# Patient Record
Sex: MALE | Race: WHITE | NOT HISPANIC OR LATINO | Employment: FULL TIME | URBAN - METROPOLITAN AREA
[De-identification: names, ages, dates, MRNs, and addresses within clinical notes are randomized per-mention and may not be internally consistent; named-entity substitution may affect disease eponyms.]

---

## 2024-06-30 ENCOUNTER — APPOINTMENT (OUTPATIENT)
Dept: CARDIOLOGY | Facility: HOSPITAL | Age: 34
End: 2024-06-30
Payer: COMMERCIAL

## 2024-06-30 ENCOUNTER — HOSPITAL ENCOUNTER (EMERGENCY)
Facility: HOSPITAL | Age: 34
Discharge: HOME | End: 2024-06-30
Attending: STUDENT IN AN ORGANIZED HEALTH CARE EDUCATION/TRAINING PROGRAM
Payer: COMMERCIAL

## 2024-06-30 VITALS
OXYGEN SATURATION: 99 % | WEIGHT: 185 LBS | BODY MASS INDEX: 28.04 KG/M2 | HEIGHT: 68 IN | HEART RATE: 77 BPM | SYSTOLIC BLOOD PRESSURE: 153 MMHG | RESPIRATION RATE: 18 BRPM | DIASTOLIC BLOOD PRESSURE: 73 MMHG | TEMPERATURE: 96.8 F

## 2024-06-30 DIAGNOSIS — M62.838 MUSCLE SPASMS OF BOTH LOWER EXTREMITIES: Primary | ICD-10-CM

## 2024-06-30 LAB
ALBUMIN SERPL BCP-MCNC: 4.8 G/DL (ref 3.4–5)
ALP SERPL-CCNC: 66 U/L (ref 33–120)
ALT SERPL W P-5'-P-CCNC: 32 U/L (ref 10–52)
ANION GAP SERPL CALC-SCNC: 11 MMOL/L (ref 10–20)
AST SERPL W P-5'-P-CCNC: 26 U/L (ref 9–39)
BASOPHILS # BLD AUTO: 0.05 X10*3/UL (ref 0–0.1)
BASOPHILS NFR BLD AUTO: 0.9 %
BILIRUB SERPL-MCNC: 0.7 MG/DL (ref 0–1.2)
BUN SERPL-MCNC: 14 MG/DL (ref 6–23)
CALCIUM SERPL-MCNC: 9.8 MG/DL (ref 8.6–10.3)
CHLORIDE SERPL-SCNC: 103 MMOL/L (ref 98–107)
CO2 SERPL-SCNC: 26 MMOL/L (ref 21–32)
CREAT SERPL-MCNC: 0.99 MG/DL (ref 0.5–1.3)
EGFRCR SERPLBLD CKD-EPI 2021: >90 ML/MIN/1.73M*2
EOSINOPHIL # BLD AUTO: 0.03 X10*3/UL (ref 0–0.7)
EOSINOPHIL NFR BLD AUTO: 0.5 %
ERYTHROCYTE [DISTWIDTH] IN BLOOD BY AUTOMATED COUNT: 11.7 % (ref 11.5–14.5)
GLUCOSE SERPL-MCNC: 86 MG/DL (ref 74–99)
HCT VFR BLD AUTO: 36.8 % (ref 41–52)
HGB BLD-MCNC: 13.3 G/DL (ref 13.5–17.5)
IMM GRANULOCYTES # BLD AUTO: 0.01 X10*3/UL (ref 0–0.7)
IMM GRANULOCYTES NFR BLD AUTO: 0.2 % (ref 0–0.9)
LYMPHOCYTES # BLD AUTO: 1.31 X10*3/UL (ref 1.2–4.8)
LYMPHOCYTES NFR BLD AUTO: 22.8 %
MAGNESIUM SERPL-MCNC: 2.05 MG/DL (ref 1.6–2.4)
MCH RBC QN AUTO: 29.9 PG (ref 26–34)
MCHC RBC AUTO-ENTMCNC: 36.1 G/DL (ref 32–36)
MCV RBC AUTO: 83 FL (ref 80–100)
MONOCYTES # BLD AUTO: 0.48 X10*3/UL (ref 0.1–1)
MONOCYTES NFR BLD AUTO: 8.4 %
NEUTROPHILS # BLD AUTO: 3.86 X10*3/UL (ref 1.2–7.7)
NEUTROPHILS NFR BLD AUTO: 67.2 %
NRBC BLD-RTO: 0 /100 WBCS (ref 0–0)
PHOSPHATE SERPL-MCNC: 3.5 MG/DL (ref 2.5–4.9)
PLATELET # BLD AUTO: 201 X10*3/UL (ref 150–450)
POTASSIUM SERPL-SCNC: 3.9 MMOL/L (ref 3.5–5.3)
PROT SERPL-MCNC: 7.4 G/DL (ref 6.4–8.2)
RBC # BLD AUTO: 4.45 X10*6/UL (ref 4.5–5.9)
SODIUM SERPL-SCNC: 136 MMOL/L (ref 136–145)
WBC # BLD AUTO: 5.7 X10*3/UL (ref 4.4–11.3)

## 2024-06-30 PROCEDURE — 99283 EMERGENCY DEPT VISIT LOW MDM: CPT | Mod: 25

## 2024-06-30 PROCEDURE — 93005 ELECTROCARDIOGRAM TRACING: CPT

## 2024-06-30 PROCEDURE — 84100 ASSAY OF PHOSPHORUS: CPT | Performed by: STUDENT IN AN ORGANIZED HEALTH CARE EDUCATION/TRAINING PROGRAM

## 2024-06-30 PROCEDURE — 83735 ASSAY OF MAGNESIUM: CPT | Performed by: STUDENT IN AN ORGANIZED HEALTH CARE EDUCATION/TRAINING PROGRAM

## 2024-06-30 PROCEDURE — 84075 ASSAY ALKALINE PHOSPHATASE: CPT | Performed by: STUDENT IN AN ORGANIZED HEALTH CARE EDUCATION/TRAINING PROGRAM

## 2024-06-30 PROCEDURE — 2500000004 HC RX 250 GENERAL PHARMACY W/ HCPCS (ALT 636 FOR OP/ED): Performed by: STUDENT IN AN ORGANIZED HEALTH CARE EDUCATION/TRAINING PROGRAM

## 2024-06-30 PROCEDURE — 85025 COMPLETE CBC W/AUTO DIFF WBC: CPT | Performed by: STUDENT IN AN ORGANIZED HEALTH CARE EDUCATION/TRAINING PROGRAM

## 2024-06-30 PROCEDURE — 96360 HYDRATION IV INFUSION INIT: CPT

## 2024-06-30 PROCEDURE — 36415 COLL VENOUS BLD VENIPUNCTURE: CPT | Performed by: STUDENT IN AN ORGANIZED HEALTH CARE EDUCATION/TRAINING PROGRAM

## 2024-06-30 ASSESSMENT — COLUMBIA-SUICIDE SEVERITY RATING SCALE - C-SSRS
1. IN THE PAST MONTH, HAVE YOU WISHED YOU WERE DEAD OR WISHED YOU COULD GO TO SLEEP AND NOT WAKE UP?: NO
6. HAVE YOU EVER DONE ANYTHING, STARTED TO DO ANYTHING, OR PREPARED TO DO ANYTHING TO END YOUR LIFE?: NO
2. HAVE YOU ACTUALLY HAD ANY THOUGHTS OF KILLING YOURSELF?: NO

## 2024-06-30 ASSESSMENT — LIFESTYLE VARIABLES
HAVE PEOPLE ANNOYED YOU BY CRITICIZING YOUR DRINKING: NO
HAVE YOU EVER FELT YOU SHOULD CUT DOWN ON YOUR DRINKING: NO
EVER FELT BAD OR GUILTY ABOUT YOUR DRINKING: NO
EVER HAD A DRINK FIRST THING IN THE MORNING TO STEADY YOUR NERVES TO GET RID OF A HANGOVER: NO
TOTAL SCORE: 0

## 2024-06-30 NOTE — ED TRIAGE NOTES
Pt comes to ED with c/o muscle twitching all over his body. Pt states he thinks it may be anxiety. Pt states he states he started prozac a week ago and stopped taking it 3 days ago

## 2024-06-30 NOTE — ED PROVIDER NOTES
HPI   Chief Complaint   Patient presents with    Spasms       This is a 33-year-old male with past medical history of anxiety presenting to the emergency department for muscle spasms.  Patient states his symptoms started yesterday with muscle spasms to the right thigh.  Since then he has had muscle spasms over the bilateral legs as well as to his forearms.  Last night he had tingling to his right arm that he noticed while he was trying to sleep.  Today he has some tingling to the left arm.  Patient unsure if this is secondary to his anxiety.  He was recently started on Prozac 1 week ago but stopped it when these symptoms started.  He otherwise denies headaches, vision changes, chest pain, shortness of breath, abdominal pain, nausea/vomiting, fever/chills, diarrhea, urinary symptoms, lower extremity pain or swelling.      History provided by:  Patient   used: No                        No data recorded                   Patient History   Past Medical History:   Diagnosis Date    Contact with and (suspected) exposure to viral hepatitis 03/15/2016    Exposure to hepatitis C    Left lower quadrant pain 03/29/2016    Colicky LLQ abdominal pain    Other pancytopenia (Multi) 02/26/2016    Pancytopenia    Personal history of diseases of the blood and blood-forming organs and certain disorders involving the immune mechanism 03/15/2016    History of anemia    Personal history of other diseases of the digestive system 03/29/2016    History of constipation    Personal history of other diseases of the nervous system and sense organs 09/16/2015    History of conjunctivitis    Personal history of other infectious and parasitic diseases 02/25/2016    History of herpes zoster    Personal history of other infectious and parasitic diseases     History of varicella    Testicular pain, unspecified 03/15/2016    Testicular pain     Past Surgical History:   Procedure Laterality Date    TONSILLECTOMY  04/08/2015     Tonsillectomy     No family history on file.  Social History     Tobacco Use    Smoking status: Not on file    Smokeless tobacco: Not on file   Substance Use Topics    Alcohol use: Not on file    Drug use: Not on file       Physical Exam   ED Triage Vitals [06/30/24 1848]   Temperature Heart Rate Respirations BP   36 °C (96.8 °F) 77 18 153/73      Pulse Ox Temp src Heart Rate Source Patient Position   99 % -- -- --      BP Location FiO2 (%)     -- --       Physical Exam  GEN: well appearing, no acute distress  HEAD: atraumatic  EYES: EOMI, PEERL  NECK: supple, no C-spine tenderness, no stepoffs or deformities  CVS/CHEST: reg rate, nl rhythm, no murmurs/gallops/rubs  PULM: CTAB b/l no wheezes, crackles, or rhonchi   GI: NT/ND, no masses or organomegaly, soft, no guarding  EXT: no LE edema   NEURO: CN 2-12 grossly intact, no focal deficits, no facial asymmetry, moving all extremities, 5/5 Strength in bicep/tricep/hip flexor/plantar flexion. Sensation over these muscle groups intact. Can ambulate without gait disturbance.  PSYCH: AAOx3 answers questions appropriately  ED Course & MDM   ED Course as of 07/01/24 1929   Sun Jun 30, 2024 2027 EKG as interpreted by me: Sinus rhythm at 67 bpm, normal axis, intervals unremarkable, no significant ST elevations or depressions [DE]   2028 Patient's lab work largely unremarkable.  He is able to ambulate without any difficulty.  I do feel that he is safe for discharge home.  Patient minimal to this plan and he will follow-up with his primary care physician.  Return precautions discussed and patient discharged stable condition. [DE]      ED Course User Index  [DE] Laureano Pearl MD         Diagnoses as of 07/01/24 1929   Muscle spasms of both lower extremities       Medical Decision Making  This is a 33-year-old male with past medical history of anxiety presenting to the emergency department for muscle spasms.  Patient stable upon presentation to the emergency department, no acute  distress and vitals are unremarkable.  On exam patient is very well-appearing.  He largely has no acute findings with full strength and sensation in all extremities.  He is answering questions appropriately.  He has no focal deficits on his exam.  Lungs are clear, abdomen is nontender.  Patient will be evaluated for potential electrolyte abnormality causing his spasms/tingling.  There may be an intolerance to Prozac given the recent start of his medication.  No concern for serotonin syndrome given history and physical.  No concern for stroke.    Procedure  Procedures     Laureano Pearl MD  07/01/24 8770

## 2024-07-01 LAB
ATRIAL RATE: 67 BPM
P AXIS: 25 DEGREES
P OFFSET: 192 MS
P ONSET: 144 MS
PR INTERVAL: 152 MS
Q ONSET: 220 MS
QRS COUNT: 11 BEATS
QRS DURATION: 88 MS
QT INTERVAL: 386 MS
QTC CALCULATION(BAZETT): 407 MS
QTC FREDERICIA: 400 MS
R AXIS: 32 DEGREES
T AXIS: 34 DEGREES
T OFFSET: 413 MS
VENTRICULAR RATE: 67 BPM

## 2024-07-03 ENCOUNTER — OFFICE VISIT (OUTPATIENT)
Dept: PRIMARY CARE | Facility: CLINIC | Age: 34
End: 2024-07-03
Payer: COMMERCIAL

## 2024-07-03 ENCOUNTER — LAB (OUTPATIENT)
Dept: LAB | Facility: LAB | Age: 34
End: 2024-07-03
Payer: COMMERCIAL

## 2024-07-03 VITALS
SYSTOLIC BLOOD PRESSURE: 136 MMHG | DIASTOLIC BLOOD PRESSURE: 86 MMHG | OXYGEN SATURATION: 98 % | HEIGHT: 68 IN | WEIGHT: 190 LBS | HEART RATE: 90 BPM | BODY MASS INDEX: 28.79 KG/M2

## 2024-07-03 DIAGNOSIS — F41.1 GENERALIZED ANXIETY DISORDER: ICD-10-CM

## 2024-07-03 DIAGNOSIS — D64.9 ANEMIA, UNSPECIFIED TYPE: ICD-10-CM

## 2024-07-03 DIAGNOSIS — R25.3 FREQUENT FASCICULATION OF MUSCLE OF EXTREMITY: ICD-10-CM

## 2024-07-03 DIAGNOSIS — Z00.00 ANNUAL PHYSICAL EXAM: ICD-10-CM

## 2024-07-03 DIAGNOSIS — Z00.00 ANNUAL PHYSICAL EXAM: Primary | ICD-10-CM

## 2024-07-03 LAB
CHOLEST SERPL-MCNC: 164 MG/DL (ref 0–199)
CHOLESTEROL/HDL RATIO: 4.1
CK SERPL-CCNC: 127 U/L (ref 0–325)
CRP SERPL-MCNC: 0.1 MG/DL
ERYTHROCYTE [SEDIMENTATION RATE] IN BLOOD BY WESTERGREN METHOD: 8 MM/H (ref 0–15)
FERRITIN SERPL-MCNC: 342 NG/ML (ref 20–300)
HCV AB SER QL: NONREACTIVE
HDLC SERPL-MCNC: 39.7 MG/DL
HIV 1+2 AB+HIV1 P24 AG SERPL QL IA: NONREACTIVE
IRON SATN MFR SERPL: 26 % (ref 25–45)
IRON SERPL-MCNC: 98 UG/DL (ref 35–150)
LDLC SERPL CALC-MCNC: 99 MG/DL
NON HDL CHOLESTEROL: 124 MG/DL (ref 0–149)
TIBC SERPL-MCNC: 371 UG/DL (ref 240–445)
TRIGL SERPL-MCNC: 129 MG/DL (ref 0–149)
TSH SERPL-ACNC: 0.68 MIU/L (ref 0.44–3.98)
UIBC SERPL-MCNC: 273 UG/DL (ref 110–370)
VLDL: 26 MG/DL (ref 0–40)

## 2024-07-03 PROCEDURE — 85652 RBC SED RATE AUTOMATED: CPT

## 2024-07-03 PROCEDURE — 83550 IRON BINDING TEST: CPT

## 2024-07-03 PROCEDURE — 82728 ASSAY OF FERRITIN: CPT

## 2024-07-03 PROCEDURE — 99395 PREV VISIT EST AGE 18-39: CPT | Performed by: STUDENT IN AN ORGANIZED HEALTH CARE EDUCATION/TRAINING PROGRAM

## 2024-07-03 PROCEDURE — 87389 HIV-1 AG W/HIV-1&-2 AB AG IA: CPT

## 2024-07-03 PROCEDURE — 80061 LIPID PANEL: CPT

## 2024-07-03 PROCEDURE — 86140 C-REACTIVE PROTEIN: CPT

## 2024-07-03 PROCEDURE — 83540 ASSAY OF IRON: CPT

## 2024-07-03 PROCEDURE — 99214 OFFICE O/P EST MOD 30 MIN: CPT | Performed by: STUDENT IN AN ORGANIZED HEALTH CARE EDUCATION/TRAINING PROGRAM

## 2024-07-03 PROCEDURE — 84443 ASSAY THYROID STIM HORMONE: CPT

## 2024-07-03 PROCEDURE — 82550 ASSAY OF CK (CPK): CPT

## 2024-07-03 PROCEDURE — 86803 HEPATITIS C AB TEST: CPT

## 2024-07-03 PROCEDURE — 36415 COLL VENOUS BLD VENIPUNCTURE: CPT

## 2024-07-03 PROCEDURE — 1036F TOBACCO NON-USER: CPT | Performed by: STUDENT IN AN ORGANIZED HEALTH CARE EDUCATION/TRAINING PROGRAM

## 2024-07-03 RX ORDER — HYDROXYZINE HYDROCHLORIDE 25 MG/1
25 TABLET, FILM COATED ORAL EVERY 8 HOURS PRN
Qty: 21 TABLET | Refills: 0 | Status: SHIPPED | OUTPATIENT
Start: 2024-07-03 | End: 2024-07-10

## 2024-07-03 NOTE — PROGRESS NOTES
"Subjective   Patient ID: Chet Parker is a 33 y.o. male who presents for the following    Assessment/Plan   Preventative Medicine  -UTD on vaccines  -Lipid panel, HCV, HIV screen. DM screen with CMP     Muscle Twitching  -2 weeks in duration, asymmetric   -Could be stress related   -Check labs  -Check EMG    VESNA  -PRN atarax rx  -Likely 2/2 to stress related to having  at home. He is also not sleeping well     Anemia   -Check Iron panel  -Pt has family hx of thalassemia but states that he was told that he does not have the trait.     HPI  33M presents to [facility] and acute sick visit.   States that for the past 1-2 weeks he has had \"twitching\" in the R thigh and numbness in the R arm. Numbness in R arm lasted for 15-20 minutes.   Since then has sporadic \"shooting\" pain and twitching in various muscle groups. NO symptoms last for more than 15-20 minutes. States that he has a  at home, but he does not feel stressed out. Is only sleeping a few hours at a time due to having to wake up to feed . Also has 4 year old child at home and states that he required anxiety medication when this child was a  due to stress.     Otherwise denies any other major complaints.   Generally in good health.   Stays active and eats healthy.     Denies fevers, chills, weight loss, lightheadedness, dizziness, vision changes, sore throat, runny nose, CP, SOB, cough, palpitations, n/v/d, abd pain, black/bloody stools, arthralgias, mood disturbance.    PMH: overweight  Surgeries: tonsillectomy      Family History   Problem Relation Name Age of Onset    Arthritis Mother Clarisa     Depression Mother Clarisa     Diabetes Mother Clarisa     Hyperlipidemia Mother Clarisa     Hypertension Mother Clarisa     Hyperlipidemia Father Mario Alberto     Breast cancer Maternal Grandmother Pura Lipscomb      Social Determinants of Health     Tobacco Use: Low Risk  (7/3/2024)    Patient History     Smoking Tobacco Use: Never     Smokeless " "Tobacco Use: Never     Passive Exposure: Not on file   Alcohol Use: Not on file   Financial Resource Strain: Not on file   Food Insecurity: Not on file   Transportation Needs: Not on file   Physical Activity: Not on file   Stress: Not on file   Social Connections: Not on file   Intimate Partner Violence: Not on file   Depression: Not on file   Housing Stability: Not on file   Utilities: Not on file   Digital Equity: Not on file   Health Literacy: Not on file     Alcohol use: occasional     Personal Hx  -Works in medical device sales  -  -2 children     Visit Vitals  /86   Pulse 90   Ht 1.727 m (5' 8\")   Wt 86.2 kg (190 lb)   SpO2 98%   BMI 28.89 kg/m²   Smoking Status Never   BSA 2.03 m²     PHYSICAL EXAM   Physical Exam     Visit Vitals  /86   Pulse 90   Ht 1.727 m (5' 8\")   Wt 86.2 kg (190 lb)   SpO2 98%   BMI 28.89 kg/m²   Smoking Status Never   BSA 2.03 m²        General: NAD. NCAT. Aox3   HEENT: PERRLA. EOMI. MMM. Nares patent bl.  Cardiovascular: RRR. No MRG. S1/S2 wnl.   Respiratory: CTABL. No acute respiratory distress.   GI: Soft, NT abdomen.   MSK: ROM x 4. CTLS non-tender.   Extremities: No edema. Cap refill < 2 sec.   Skin: No rashes or bruises.   Neuro: Aox3. Cranial Nerves grossly intact. Motor/sensory wnl. DTRs equal and symmetric in BLLE and BLUEs  Psych: Mood wnl.       REVIEW OF SYSTEMS   ROS in HPI     No Known Allergies    Current Outpatient Medications   Medication Sig Dispense Refill    semaglutide (OZEMPIC) 1 mg/dose (4 mg/3 mL) pen injector Inject 1 mg under the skin every 7 days.       No current facility-administered medications for this visit.       Objective     Admission on 06/30/2024, Discharged on 06/30/2024   Component Date Value Ref Range Status    WBC 06/30/2024 5.7  4.4 - 11.3 x10*3/uL Final    nRBC 06/30/2024 0.0  0.0 - 0.0 /100 WBCs Final    RBC 06/30/2024 4.45 (L)  4.50 - 5.90 x10*6/uL Final    Hemoglobin 06/30/2024 13.3 (L)  13.5 - 17.5 g/dL Final    " Hematocrit 06/30/2024 36.8 (L)  41.0 - 52.0 % Final    MCV 06/30/2024 83  80 - 100 fL Final    MCH 06/30/2024 29.9  26.0 - 34.0 pg Final    MCHC 06/30/2024 36.1 (H)  32.0 - 36.0 g/dL Final    RDW 06/30/2024 11.7  11.5 - 14.5 % Final    Platelets 06/30/2024 201  150 - 450 x10*3/uL Final    Neutrophils % 06/30/2024 67.2  40.0 - 80.0 % Final    Immature Granulocytes %, Automated 06/30/2024 0.2  0.0 - 0.9 % Final    Lymphocytes % 06/30/2024 22.8  13.0 - 44.0 % Final    Monocytes % 06/30/2024 8.4  2.0 - 10.0 % Final    Eosinophils % 06/30/2024 0.5  0.0 - 6.0 % Final    Basophils % 06/30/2024 0.9  0.0 - 2.0 % Final    Neutrophils Absolute 06/30/2024 3.86  1.20 - 7.70 x10*3/uL Final    Immature Granulocytes Absolute, Au* 06/30/2024 0.01  0.00 - 0.70 x10*3/uL Final    Lymphocytes Absolute 06/30/2024 1.31  1.20 - 4.80 x10*3/uL Final    Monocytes Absolute 06/30/2024 0.48  0.10 - 1.00 x10*3/uL Final    Eosinophils Absolute 06/30/2024 0.03  0.00 - 0.70 x10*3/uL Final    Basophils Absolute 06/30/2024 0.05  0.00 - 0.10 x10*3/uL Final    Glucose 06/30/2024 86  74 - 99 mg/dL Final    Sodium 06/30/2024 136  136 - 145 mmol/L Final    Potassium 06/30/2024 3.9  3.5 - 5.3 mmol/L Final    Chloride 06/30/2024 103  98 - 107 mmol/L Final    Bicarbonate 06/30/2024 26  21 - 32 mmol/L Final    Anion Gap 06/30/2024 11  10 - 20 mmol/L Final    Urea Nitrogen 06/30/2024 14  6 - 23 mg/dL Final    Creatinine 06/30/2024 0.99  0.50 - 1.30 mg/dL Final    eGFR 06/30/2024 >90  >60 mL/min/1.73m*2 Final    Calcium 06/30/2024 9.8  8.6 - 10.3 mg/dL Final    Albumin 06/30/2024 4.8  3.4 - 5.0 g/dL Final    Alkaline Phosphatase 06/30/2024 66  33 - 120 U/L Final    Total Protein 06/30/2024 7.4  6.4 - 8.2 g/dL Final    AST 06/30/2024 26  9 - 39 U/L Final    Bilirubin, Total 06/30/2024 0.7  0.0 - 1.2 mg/dL Final    ALT 06/30/2024 32  10 - 52 U/L Final    Magnesium 06/30/2024 2.05  1.60 - 2.40 mg/dL Final    Phosphorus 06/30/2024 3.5  2.5 - 4.9 mg/dL Final     Ventricular Rate 06/30/2024 67  BPM Preliminary    Atrial Rate 06/30/2024 67  BPM Preliminary    RI Interval 06/30/2024 152  ms Preliminary    QRS Duration 06/30/2024 88  ms Preliminary    QT Interval 06/30/2024 386  ms Preliminary    QTC Calculation(Bazett) 06/30/2024 407  ms Preliminary    P Axis 06/30/2024 25  degrees Preliminary    R Axis 06/30/2024 32  degrees Preliminary    T Axis 06/30/2024 34  degrees Preliminary    QRS Count 06/30/2024 11  beats Preliminary    Q Onset 06/30/2024 220  ms Preliminary    P Onset 06/30/2024 144  ms Preliminary    P Offset 06/30/2024 192  ms Preliminary    T Offset 06/30/2024 413  ms Preliminary    QTC Fredericia 06/30/2024 400  ms Preliminary       Radiology: Reviewed imaging in powerchart.  ECG 12 lead    Result Date: 7/1/2024  Normal sinus rhythm Normal ECG No previous ECGs available      Family History   Problem Relation Name Age of Onset    Arthritis Mother Clarisa     Depression Mother Clarisa     Diabetes Mother Clarisa     Hyperlipidemia Mother Clarisa     Hypertension Mother Clarisa     Hyperlipidemia Father Mario Alberto     Breast cancer Maternal Grandmother Pura Lipscomb      Social History     Socioeconomic History    Marital status:      Spouse name: None    Number of children: None    Years of education: None    Highest education level: None   Occupational History    None   Tobacco Use    Smoking status: Never    Smokeless tobacco: Never   Substance and Sexual Activity    Alcohol use: Yes     Alcohol/week: 4.0 standard drinks of alcohol     Types: 2 Cans of beer, 2 Shots of liquor per week    Drug use: Never    Sexual activity: Yes     Partners: Female     Birth control/protection: I.U.D.   Other Topics Concern    None   Social History Narrative    None     Social Determinants of Health     Financial Resource Strain: Not on file   Food Insecurity: Not on file   Transportation Needs: Not on file   Physical Activity: Not on file   Stress: Not on file   Social Connections: Not on  file   Intimate Partner Violence: Not on file   Housing Stability: Not on file     Past Medical History:   Diagnosis Date    Contact with and (suspected) exposure to viral hepatitis 03/15/2016    Exposure to hepatitis C    Left lower quadrant pain 03/29/2016    Colicky LLQ abdominal pain    Other pancytopenia (Multi) 02/26/2016    Pancytopenia    Personal history of diseases of the blood and blood-forming organs and certain disorders involving the immune mechanism 03/15/2016    History of anemia    Personal history of other diseases of the digestive system 03/29/2016    History of constipation    Personal history of other diseases of the nervous system and sense organs 09/16/2015    History of conjunctivitis    Personal history of other infectious and parasitic diseases 02/25/2016    History of herpes zoster    Personal history of other infectious and parasitic diseases     History of varicella    Testicular pain, unspecified 03/15/2016    Testicular pain     Past Surgical History:   Procedure Laterality Date    TONSILLECTOMY  04/08/2015    Tonsillectomy       Charting was completed using voice recognition technology and may include unintended errors.

## 2024-07-05 ENCOUNTER — TELEPHONE (OUTPATIENT)
Dept: PRIMARY CARE | Facility: CLINIC | Age: 34
End: 2024-07-05
Payer: COMMERCIAL

## 2024-07-05 DIAGNOSIS — R53.83 FATIGUE, UNSPECIFIED TYPE: ICD-10-CM

## 2024-07-05 NOTE — TELEPHONE ENCOUNTER
----- Message from Harley Solo MD sent at 7/5/2024  2:25 PM EDT -----  Ferritin is mildly elevated. Non-specific on its own. All other labs wnl.

## 2024-07-08 NOTE — TELEPHONE ENCOUNTER
Spoke to patient. Informed him of below information. Pt expressed understanding.     He wants to know if a lab can be ordered to check testosterone.

## 2024-07-10 ENCOUNTER — LAB (OUTPATIENT)
Dept: LAB | Facility: LAB | Age: 34
End: 2024-07-10
Payer: COMMERCIAL

## 2024-07-10 DIAGNOSIS — R53.83 FATIGUE, UNSPECIFIED TYPE: ICD-10-CM

## 2024-07-10 PROCEDURE — 84402 ASSAY OF FREE TESTOSTERONE: CPT

## 2024-07-10 PROCEDURE — 36415 COLL VENOUS BLD VENIPUNCTURE: CPT

## 2024-07-15 ENCOUNTER — PATIENT MESSAGE (OUTPATIENT)
Dept: PRIMARY CARE | Facility: CLINIC | Age: 34
End: 2024-07-15
Payer: COMMERCIAL

## 2024-07-15 ENCOUNTER — TELEPHONE (OUTPATIENT)
Dept: PRIMARY CARE | Facility: CLINIC | Age: 34
End: 2024-07-15
Payer: COMMERCIAL

## 2024-07-15 LAB
TESTOSTERONE FREE (CHAN): 83.6 PG/ML (ref 35–155)
TESTOSTERONE,TOTAL,LC-MS/MS: 486 NG/DL (ref 250–1100)

## 2024-07-15 NOTE — TELEPHONE ENCOUNTER
----- Message from Harley Solo sent at 7/15/2024  1:45 PM EDT -----  Morning testosterone levels l

## 2024-08-06 ENCOUNTER — HOSPITAL ENCOUNTER (OUTPATIENT)
Dept: NEUROLOGY | Facility: HOSPITAL | Age: 34
Discharge: HOME | End: 2024-08-06
Payer: COMMERCIAL

## 2024-08-06 DIAGNOSIS — R25.3 FREQUENT FASCICULATION OF MUSCLE OF EXTREMITY: ICD-10-CM

## 2024-08-06 PROCEDURE — 95886 MUSC TEST DONE W/N TEST COMP: CPT | Performed by: PSYCHIATRY & NEUROLOGY

## 2024-08-06 PROCEDURE — 95908 NRV CNDJ TST 3-4 STUDIES: CPT | Performed by: PSYCHIATRY & NEUROLOGY

## 2024-08-13 ENCOUNTER — TELEPHONE (OUTPATIENT)
Dept: PRIMARY CARE | Facility: CLINIC | Age: 34
End: 2024-08-13
Payer: COMMERCIAL

## 2024-08-30 ENCOUNTER — PATIENT MESSAGE (OUTPATIENT)
Dept: PRIMARY CARE | Facility: CLINIC | Age: 34
End: 2024-08-30
Payer: COMMERCIAL

## 2024-10-16 ENCOUNTER — APPOINTMENT (OUTPATIENT)
Dept: NEUROLOGY | Facility: HOSPITAL | Age: 34
End: 2024-10-16
Payer: COMMERCIAL

## 2024-12-31 ENCOUNTER — APPOINTMENT (OUTPATIENT)
Dept: ORTHOPEDIC SURGERY | Facility: CLINIC | Age: 34
End: 2024-12-31
Payer: COMMERCIAL

## 2024-12-31 DIAGNOSIS — M25.531 WRIST PAIN, RIGHT: ICD-10-CM

## 2024-12-31 DIAGNOSIS — S63.501A RIGHT WRIST SPRAIN, INITIAL ENCOUNTER: Primary | ICD-10-CM

## 2024-12-31 PROCEDURE — 99203 OFFICE O/P NEW LOW 30 MIN: CPT | Performed by: FAMILY MEDICINE

## 2024-12-31 PROCEDURE — 1036F TOBACCO NON-USER: CPT | Performed by: FAMILY MEDICINE

## 2024-12-31 NOTE — PROGRESS NOTES
History of Present Illness   Chief Complaint   Patient presents with    Right Wrist - Injury     [id number]  FELL ICE SKAITING       The patient is 34 y.o. right-hand-dominant male  here with a complaint of   right wrist pain.  Onset of symptoms around 6 weeks ago after falling while ice-skating landing on outstretched right wrist, says he fell off to his right side with some acute onset of pain.  Symptoms are ongoing for the next few weeks prompting him to be seen at outside urgent care, he had x-rays obtained there, report but no imaging available for review, report of right wrist x-ray was read as unremarkable, negative for fracture.  He monitored for symptom improvement for an additional 2 to 3 weeks, given ongoing pain he scheduled follow-up visit with me today for further evaluation.  He says pain is focal to the ulnar aspect of his wrist, no significant pain with rest, occasional pain with day-to-day activity, he says that pushing himself up from a seated position will cause pain, he has attempted to try a push-up with pain, he enjoys bowling but is not able to do so because of wrist pain.  He denies any swelling or bruising now or at time of injury.  No other specific treatments aside from activity modification.    Past Medical History:   Diagnosis Date    Contact with and (suspected) exposure to viral hepatitis 03/15/2016    Exposure to hepatitis C    Left lower quadrant pain 03/29/2016    Colicky LLQ abdominal pain    Other pancytopenia (Multi) 02/26/2016    Pancytopenia    Personal history of diseases of the blood and blood-forming organs and certain disorders involving the immune mechanism 03/15/2016    History of anemia    Personal history of other diseases of the digestive system 03/29/2016    History of constipation    Personal history of other diseases of the nervous system and sense organs 09/16/2015    History of conjunctivitis    Personal history of other infectious and parasitic diseases 02/25/2016     History of herpes zoster    Personal history of other infectious and parasitic diseases     History of varicella    Testicular pain, unspecified 03/15/2016    Testicular pain       Medication Documentation Review Audit       Reviewed by Harley Solo MD (Physician) on 07/04/24 at 1806      Medication Order Taking? Sig Documenting Provider Last Dose Status   hydrOXYzine HCL (Atarax) 25 mg tablet [id number]  Take 1 tablet (25 mg) by mouth every 8 hours if needed for anxiety for up to 7 days. Harley Solo MD  Active   semaglutide (OZEMPIC) 1 mg/dose (4 mg/3 mL) pen injector [id number] Yes Inject 1 mg under the skin every 7 days. Historical Provider, MD Taking Active                    No Known Allergies    Social History     Socioeconomic History    Marital status:      Spouse name: Not on file    Number of children: Not on file    Years of education: Not on file    Highest education level: Not on file   Occupational History    Not on file   Tobacco Use    Smoking status: Never    Smokeless tobacco: Never   Substance and Sexual Activity    Alcohol use: Yes     Alcohol/week: 4.0 standard drinks of alcohol     Types: 2 Cans of beer, 2 Shots of liquor per week    Drug use: Never    Sexual activity: Yes     Partners: Female     Birth control/protection: I.U.D.   Other Topics Concern    Not on file   Social History Narrative    Not on file     Social Drivers of Health     Financial Resource Strain: Not on file   Food Insecurity: Not on file   Transportation Needs: Not on file   Physical Activity: Not on file   Stress: Not on file   Social Connections: Not on file   Intimate Partner Violence: Not on file   Housing Stability: Not on file       Past Surgical History:   Procedure Laterality Date    TONSILLECTOMY  04/08/2015    Tonsillectomy          Review of Systems   GENERAL: Negative  GI: Negative  MUSCULOSKELETAL: See HPI  SKIN: Negative  NEURO:  Negative     Physical Exam:    General/Constitutional: well  appearing, no distress, appears stated age  HEENT: sclera clear  Respiratory: non labored breathing  Vascular: No edema, swelling or tenderness, except as noted in detailed exam.  Integumentary: No impressive skin lesions present, except as noted in detailed exam.  Neurological:  Alert and oriented   Psychological:  Normal mood and affect.  Musculoskeletal: Normal, except as noted in detailed exam and in HPI    Right wrist: Normal appearance, no swelling, no skin changes.  He is focally tender to palpation near the ulnar wrist joint at the TFCC, no other areas of tenderness to palpation.  He has good range of motion with flexion and extension, mild pain endrange of flexion.  Good mobility with pronation and supination without pain, there is pain with active and passive ulnar deviation of the wrist.  Positive TFCC grind.  There is no instability of the DRUJ.  The hand there is no motor or sensory deficits of the median, ulnar, radial nerve distributions.  2+ radial pulse       Imaging: X-ray report of right wrist from urgent care available for review, negative for fracture, other derangement, no degenerative changes mentioned.      Assessment   1. Right wrist sprain, initial encounter  Wrist brace      2. Wrist pain, right  XR wrist right 3+ views            Plan: Right wrist injury, 6 weeks out from injury, focal tenderness at the ulnar wrist joint concerning for possible TFCC tear.  We discussed further workup and treatment.  Patient was interested in more conservative management.  Will trial conservative treatment with brace immobilization for the next 4 weeks, he can come out of the brace to do some gentle range of motion exercises.  Plan for reassessment in 4 weeks time.  If ongoing symptoms could consider MRI for further evaluation of TFCC tear.

## 2025-02-04 ENCOUNTER — APPOINTMENT (OUTPATIENT)
Dept: ORTHOPEDIC SURGERY | Facility: CLINIC | Age: 35
End: 2025-02-04
Payer: COMMERCIAL

## 2025-02-04 DIAGNOSIS — M25.531 WRIST PAIN, RIGHT: ICD-10-CM

## 2025-02-04 DIAGNOSIS — S63.501D RIGHT WRIST SPRAIN, SUBSEQUENT ENCOUNTER: Primary | ICD-10-CM

## 2025-02-04 PROCEDURE — 99213 OFFICE O/P EST LOW 20 MIN: CPT | Performed by: FAMILY MEDICINE

## 2025-02-04 PROCEDURE — 1036F TOBACCO NON-USER: CPT | Performed by: FAMILY MEDICINE

## 2025-02-04 NOTE — PROGRESS NOTES
History of Present Illness   Chief Complaint   Patient presents with    Right Wrist - Follow-up       The patient is 34 y.o. right-hand-dominant male  here for follow-up of right wrist injury/pain.  Patient was initially seen by me 1 month ago, onset of symptoms approximately 2.5 months ago after falling ice-skating landing on outstretched right hand/wrist.  He saw urgent care provider a few weeks after injury where x-rays were taken and unremarkable for any acute fracture or other injury.  He had ongoing pain for the few weeks prompting him to be seen by me in clinic, at that time he had some focal pain to palpation over the ulnar aspect of his wrist, symptom exacerbated by loading the wrist, pushing himself up in the seated position, enjoys bowling but unable to do so because of pain.  There was some concern for possible TFCC injury at her initial visit.  I recommended trial of conservative management with wrist immobilization for 3 to 4 weeks with follow-up today for reevaluation.  He says that over the past month he has seen some improvement in symptoms but symptoms have not fully resolved.  He has been compliant with immobilization in the brace.  He feels like there is been around 50% improvement, he has less pain pushing up from a seated position.  He says that he was able to do some push-ups today, did about 10 but with discomfort albeit less than previous.  He has not attempted bowling.      Past Medical History:   Diagnosis Date    Contact with and (suspected) exposure to viral hepatitis 03/15/2016    Exposure to hepatitis C    Left lower quadrant pain 03/29/2016    Colicky LLQ abdominal pain    Other pancytopenia (Multi) 02/26/2016    Pancytopenia    Personal history of diseases of the blood and blood-forming organs and certain disorders involving the immune mechanism 03/15/2016    History of anemia    Personal history of other diseases of the digestive system 03/29/2016    History of constipation     Personal history of other diseases of the nervous system and sense organs 2015    History of conjunctivitis    Personal history of other infectious and parasitic diseases 2016    History of herpes zoster    Personal history of other infectious and parasitic diseases     History of varicella    Testicular pain, unspecified 03/15/2016    Testicular pain       Medication Documentation Review Audit       Reviewed by Charles Estrada MD (Physician) on 24 at 1348      Medication Order Taking? Sig Documenting Provider Last Dose Status   hydrOXYzine HCL (Atarax) 25 mg tablet [id number]  Take 1 tablet (25 mg) by mouth every 8 hours if needed for anxiety for up to 7 days. Harley Solo MD   07/10/24 2846   semaglutide (OZEMPIC) 1 mg/dose (4 mg/3 mL) pen injector [id number] No Inject 1 mg under the skin every 7 days. Historical Provider, MD Taking Active                    No Known Allergies    Social History     Socioeconomic History    Marital status:      Spouse name: Not on file    Number of children: Not on file    Years of education: Not on file    Highest education level: Not on file   Occupational History    Not on file   Tobacco Use    Smoking status: Never    Smokeless tobacco: Never   Substance and Sexual Activity    Alcohol use: Yes     Alcohol/week: 4.0 standard drinks of alcohol     Types: 2 Cans of beer, 2 Shots of liquor per week    Drug use: Never    Sexual activity: Yes     Partners: Female     Birth control/protection: I.U.D.   Other Topics Concern    Not on file   Social History Narrative    Not on file     Social Drivers of Health     Financial Resource Strain: Not on file   Food Insecurity: Not on file   Transportation Needs: Not on file   Physical Activity: Not on file   Stress: Not on file   Social Connections: Not on file   Intimate Partner Violence: Not on file   Housing Stability: Not on file       Past Surgical History:   Procedure Laterality Date    TONSILLECTOMY   04/08/2015    Tonsillectomy          Review of Systems   GENERAL: Negative  GI: Negative  MUSCULOSKELETAL: See HPI  SKIN: Negative  NEURO:  Negative     Physical Exam:    General/Constitutional: well appearing, no distress, appears stated age  HEENT: sclera clear  Respiratory: non labored breathing  Vascular: No edema, swelling or tenderness, except as noted in detailed exam.  Integumentary: No impressive skin lesions present, except as noted in detailed exam.  Neurological:  Alert and oriented   Psychological:  Normal mood and affect.  Musculoskeletal: Normal, except as noted in detailed exam and in HPI    Right wrist: Normal appearance, no swelling, no skin changes.   Positive tenderness to palpation near the ulnar wrist joint at the TFCC, improved from previous visit no other areas of tenderness to palpation.  He has good range of motion with flexion and extension without pain today.  Good mobility with pronation and supination without pain, there is pain with active and passive ulnar deviation of the wrist.  Positive TFCC grind.  There is no instability of the DRUJ.  The hand there is no motor or sensory deficits of the median, ulnar, radial nerve distributions.  2+ radial pulse       Imaging: No new imaging today, previous x-rays of the right wrist were unremarkable from urgent care    Assessment   1. Right wrist sprain, subsequent encounter        2. Wrist pain, right  MR wrist right wo IV contrast              Plan: Ongoing but improved right wrist pain after FOOSH injury a little over 2 months ago, symptoms concerning for possible TFCC tear.  Discussed further workup and treatment.  We did place order for MRI of the right wrist for further evaluation.  We discussed treatments of potential TFCC including possible PRP injection which she was interested in, he will contemplate this.  Stated that we could consider TFCC injection with PRP prior to MRI if he did not want to pursue the MRI right away.  He plans to  contemplate his options, he will reach out if he would like to pursue PRP injection understanding it is not covered by insurance and cost $711.  States that if he was going to do surgery he would probably see Dr. Liu who he has worked with in the past.  All questions and concerns were answered.

## 2025-02-13 NOTE — PROGRESS NOTES
"Verbal consent of the patient and/or verbal parental consent for patients under the age of 18 have been obtained to conduct a physical examination at this office visit.  The , LILLIAN BOYER, was present in the room during the entire visit including, but not limited to the physical examination!.     New patient  History Of Present Illness  02/17/25 Chet Parker \"Guillermo\" is a 34 y.o. male who presents for an evaluation of their Right Wrist and to discuss PRP injections. Patient reports he fell after being tripped while ice skating, attempted to brace himself with his outstretched arm, and his RIGHT wrist was forced into extension. Patient denied experiencing a pop, snap or crack at that time and also denied numbness , tingling, and pins and needles, reporting 4/10 pain. Patient stated he went to an urgent care location when the pain did not resolve after two weeks to rule out fracture, X-rays did not show a fracture. Patient then went to Dr. Charles Estrada, orthopedic surgeon, was placed in a brace, given HEP exercises, and graciously referred to this office. Patient states he worse the brace for five weeks, but has discontinued per dr orders, noting the pain has decreased and is only aggravated with activity requiring wrist extension for prolonged periods of time.  Patient notes that he rests the wrist to allow pain to resolve, but is not taking NSAIDs.   We talked in detail about his wrist and based off his physical examination I told him to continue to do some of the exercises and went through some modifications do for working out and activities.  I told him if it does not not continue to get better in about a month then I feel we should do an MRI of the wrist to further evaluate the TFCC to see if part of it since it is a complex has a tear or perforation.  We talked in detail about regenerative injections such as Prolozone and PRP how he would be a very good candidate however since he is doing well and not " really having as much pain and improving I told him to save his money at this time and just work on some of his exercises and we will see how he does in a month.  At that time if he is not doing better we will potentially get an MRI or we may just jump straight to the regenerative injections.    All previous Progress Notes and imaging results related to this patients chief complaint have been reviewed in preparation for this examination.    Past Medical History  He has a past medical history of Contact with and (suspected) exposure to viral hepatitis (03/15/2016), Left lower quadrant pain (03/29/2016), Other pancytopenia (Multi) (02/26/2016), Personal history of diseases of the blood and blood-forming organs and certain disorders involving the immune mechanism (03/15/2016), Personal history of other diseases of the digestive system (03/29/2016), Personal history of other diseases of the nervous system and sense organs (09/16/2015), Personal history of other infectious and parasitic diseases (02/25/2016), Personal history of other infectious and parasitic diseases, and Testicular pain, unspecified (03/15/2016).    Surgical History  He has a past surgical history that includes Tonsillectomy (04/08/2015).     Social History  He reports that he has never smoked. He has never used smokeless tobacco. He reports current alcohol use of about 4.0 standard drinks of alcohol per week. He reports that he does not use drugs.    Family History  Family History   Problem Relation Name Age of Onset    Arthritis Mother Clarisa     Depression Mother Clarisa     Diabetes Mother Clarisa     Hyperlipidemia Mother Clarisa     Hypertension Mother Clarisa     Hyperlipidemia Father Mario Alberto     Breast cancer Maternal Grandmother Pura Lipscomb         Allergies  Patient has no known allergies.    Review of Systems  CONSTITUTIONAL:   Negative for weight change, loss of appetite, fatigue, weakness, fever, chills, night sweats, headaches .           HEENT:    Negative for cold, cough, sore throat, sinus pain, swollen lymph nodes.           OPHTHALMOLOGY:   Negative for diminished vision, blurred vision, loss of vision, double vision.           ALLERGY:   Negative for runny nose, scratchy throat, sinus congestion, rash, facial pressure, nasal congestion, post-nasal drip.           CARDIOLOGY:   Negative for chest pain, palpitations, murmurs, irregular heart beat, shortness of breath, leg edema, dyspnea on exertion, fatigue, dizziness.           RESPIRATORY:   Negative for chest pain, shortness of breath, swelling of the legs, asthma/copd, chest congestion, pain with breathing .           GASTROENTEROLOGY:   Negative for nausea, vomitting, heartburn, constipation, diarrhea, blood in stool, change in bowel habits, black stool.           HEMATOLOGY/LYMPH:   Negative for fatigue, loss of appetitie, easy bruising, easy bleeding, anemia, abnormal bleeding, slow healing.           ENDOCRINOLOGY:   Negative for polyuria, polydipsia, polyphagia, fatigue, weight loss, weight gain, cold intolerance, heat intolerance, diabetes.           MUSCULOSKELETAL:   Positive  for Right Wrist pain        DERMATOLOGY:   Negative for rash, bruising.           NEUROLOGY:   Negative for tingling, numbness, gait abnormality, paresthesias, weakness, sciatica.        Examination:  Right  Wrist  Erythema: Negative.   Edema: Negative.   Effusion: Negative.   Warmth: Negative.   Ecchymosis/Bruising: Negative.   Percussion Test: Negative.   Tuning Fork Test: Negative.   Abrasions: Negative.   Orientation: Symmetrical.            ROM:   Positive FROM, however, with extension causes pain in the region of the   TFCC;  thumb and forefinger opposition with flexion causes pain in same area    Wrist Flexion (80 degrees)  Positive Wrist Extension (70 degrees)  Wrist Supination (90 degrees)  Wrist Pronation (90 degrees)         Positive Wrist Ulnar Deviation (30 degrees) causes pain over region of the  TFCC  Wrist Radial Deviation (20 degrees)          Finger MCP (100 degrees)/ PIP (100 degrees)/ DIP (90 degrees) Flexion  Finger MCP (30 degrees) /PIP (0 degrees) / DIP (20 degrees) Extension          Fingers ABduction (20 degrees)  Fingers ADduction (0 degrees)          Positive Thumb & Fingertip Opposition with wrist flexion causes pain TFCC region  Thumb Circumduction.            Muscle Strength: Positive causes pain with testing however no weakness  Positive +5/+5 Wrist Flexion causes pain over region of TFCC   Positive +5/+5 Wrist Extension  causes pain over region of TFCC  +5/+5 Wrist Supination  +5/+5 Wrist Pronation          Positive +5/+5 Wrist Ulnar Deviation causes pain over region of TFCC  +5/+5 Wrist Radial Deviation         +5/+5 Finger MCP/PIP/DIP Flexion  +5/+5 Finger MCP/PIP/DIP Extension          +5/+5 Finger Abduction  +5/+5 Finger Adduction          Positive +5/+5 Finger/Thumb Opposition causes pain over region of TFCC  +5/+5 Thumb Circumduction.            Motor/Neurological:    Normal  Tip of Thumb (Median Nerve)  Tip of 5th Finger (Ulnar Nerve)  Flex and Extend Thumb (Median and Radial Nerve)  Scissor Fingers (Ulnar Nerve)  Raise Thumb Against Resistance on Flat Surface (Median Nerve).          Sensation/Neurological:   Negative, Sensation Intact, 2 Point Discrimination Test Negative         C6: Lateral forearms, thumbs, anterior index finger, lateral half of the middle finger  C7: Some of posterior index finger, medial half of middle finger, upper posterior back, back of arms  C8: Ring finger, little finger, medial forearm, posterior upper back.     Palpation:   Positive Tenderness to Palpation Right Ulnar  wrist over area of TFCC         Vascular:   +2/+4 Radial Pulse  +2/+4 Ulnar Pulse  Capillary Refill < 2 seconds.               Wrist/Hand/Finger - Motor Function:  Montreat-Littler Test: Negative.   Retinacular Test: Negative.   Princh  Test: Negative.   Key  Test: Negative.   Power   Test: Negative.   Jeffy  Test: Negative.         Wrist/Hand/Finger - Nerve Lesions/Compression Neuropathies:  Carpal Tunnel Sign Test: Negative.   Phalen's Sign Test: Negative.   Reverse Phalen's Sign Test: Negative.   Wartenberg Sign Test: Negative.   Tinel's Sign Test: Negative.   Opposition Test: Negative.   Pinch Test: Negative.   Carpal/Digital Compression Test: Negative.   Ochsner Test: Negative.   Froment's Sign Ulnar Nerve Test: Negative.   Intrinsic Ulnar Nerve Test: Negative.   O Test: Negative.         Wrist/Hand/Finger - Stability:  Valgus Stess Test: Negative.   Varus Stess Test: Negative.   Viramontes Scaphoid Shift Test: Negative.   Scapholunate Ballotment Test: Negative.   Nestor Test: Negative.   Dorsal Capitate Displacement Apprehension Test: Negative.   Shuck Test: Negative.         Wrist/Hand/Finger - Tenosynovitis:  Flexor Digitorum Profundus Test:  Negative.   Flexor Digitorum Superficialis Test:  Negative.   Flexor Pollicis Longus Test:  Negative.   Flexor Pollicis Brevis Test:  Negative.   Flexor Radial Longus Test:  Negative.   Flexor Radial Brevis Test:  Negative.   Extensor Digitorum Profundus Test:  Negative.   Extensor Digitorum Superficialis Test:  Negative.   Extensor Pollicis Longus Test:  Negative.   Extensor Pollicis Brevis (EPB)[Dequervain's):  Negative.   Extensor Radial Longus Test:  Negative.   Extensor Radial Brevis Test:  Negative.   Muckard Test:  Negative.   Finklestein Test:  Negative.   Kanavel Sign:  Negative.   Flexor and Extensor Longus Test:  Negative.         Wrist/Hand/Finger - TFCC:  Supination Lift Test: Positive: RIGHT    Grind Test: Positive : RIGHT             Imaging and Diagnostics Review:  X-rays done at the urgent care were negative for fracture or any signs of subluxations or abnormalities    Assessment   1. Right wrist pain        2. TFCC (triangular fibrocartilage complex) injury, right, initial encounter        3. Wrist sprain, right, initial  encounter            Treatment or Intervention:  May continue alternating ice and moist heat therapy as needed  ,   Recommend that patient purchase an OTC TFCC Widget to be used for activity.   Patient was instructed that should the brace result in increased pain, decreased sensation, numbness and/or tingling, increased swelling, or an overall worsening of their medical condition; to please contact our office immediately. O    Reviewed home exercises to be performed by the patient routinely in the gym to help strengthen around the wrist as well as forearms  Went over ice cup icing massage to be performed by the patient regularly ,   Patient advised regarding the risks and/or potential adverse reactions and/or side effects of any prescribed medications along with any over-the-counter medications or any supplements used. Patient advised to seek immediate medical care if any adverse reactions occur. The patient and/or patient(s) parent(s) verbalized their understanding  ,   Possibility in the future of MRI of the RIGHT  wrist to rule out ligament or tendon injury and to further evaluate for stress fracture versus other     Possibility in the future of regenerative injections such as Prolotherapy, Prolozone, or PRP injections into the right wrist.   Follow-up for a reevaluation as needed or if he is not improving and would like to start regenerative injections.     Please note that this report has been produced using speech recognition software.  It may contain errors related to grammar, punctuation or spelling.  Electronically signed, but not reviewed.  Hue Olguin D.O. LITO, Director of Sports Medicine   HUE OLGUIN on 2/17/25 at 6:55 PM.     LITO Medina DO

## 2025-02-17 ENCOUNTER — OFFICE VISIT (OUTPATIENT)
Dept: SPORTS MEDICINE | Facility: CLINIC | Age: 35
End: 2025-02-17
Payer: COMMERCIAL

## 2025-02-17 VITALS
DIASTOLIC BLOOD PRESSURE: 74 MMHG | HEIGHT: 68 IN | WEIGHT: 180 LBS | HEART RATE: 65 BPM | BODY MASS INDEX: 27.28 KG/M2 | SYSTOLIC BLOOD PRESSURE: 110 MMHG

## 2025-02-17 DIAGNOSIS — S69.81XA TFCC (TRIANGULAR FIBROCARTILAGE COMPLEX) INJURY, RIGHT, INITIAL ENCOUNTER: ICD-10-CM

## 2025-02-17 DIAGNOSIS — S63.501A WRIST SPRAIN, RIGHT, INITIAL ENCOUNTER: ICD-10-CM

## 2025-02-17 DIAGNOSIS — M25.531 RIGHT WRIST PAIN: ICD-10-CM

## 2025-02-17 PROCEDURE — 99204 OFFICE O/P NEW MOD 45 MIN: CPT | Performed by: FAMILY MEDICINE

## 2025-02-17 PROCEDURE — 1036F TOBACCO NON-USER: CPT | Performed by: FAMILY MEDICINE

## 2025-02-17 PROCEDURE — 99214 OFFICE O/P EST MOD 30 MIN: CPT | Performed by: FAMILY MEDICINE

## 2025-02-17 PROCEDURE — 3008F BODY MASS INDEX DOCD: CPT | Performed by: FAMILY MEDICINE

## 2025-02-17 ASSESSMENT — ENCOUNTER SYMPTOMS
OCCASIONAL FEELINGS OF UNSTEADINESS: 0
LOSS OF SENSATION IN FEET: 0
DEPRESSION: 0

## 2025-02-17 ASSESSMENT — PAIN - FUNCTIONAL ASSESSMENT: PAIN_FUNCTIONAL_ASSESSMENT: 0-10

## 2025-02-17 ASSESSMENT — PAIN SCALES - GENERAL
PAINLEVEL_OUTOF10: 2
PAINLEVEL_OUTOF10: 2

## 2025-02-17 ASSESSMENT — PATIENT HEALTH QUESTIONNAIRE - PHQ9
SUM OF ALL RESPONSES TO PHQ9 QUESTIONS 1 AND 2: 0
2. FEELING DOWN, DEPRESSED OR HOPELESS: NOT AT ALL
1. LITTLE INTEREST OR PLEASURE IN DOING THINGS: NOT AT ALL

## 2025-02-17 NOTE — PATIENT INSTRUCTIONS
Treatment or Intervention:  May continue PRICE therapy as needed  ,   Recommend that patient purchase an OTC TFCC Widget to be used for activity. Patient was instructed that should the brace result in increased pain, decreased sensation, numbness and/or tingling, increased swelling, or an overall worsening of their medical condition; to please contact our office immediately. O    Reviewed home exercises to be performed by the patient routinely , Went over ice cup icing massage to be performed by the patient regularly ,   Patient advised regarding the risks and/or potential adverse reactions and/or side effects of any prescribed medications along with any over-the-counter medications or any supplements used. Patient advised to seek immediate medical care if any adverse reactions occur. The patient and/or patient(s) parent(s) verbalized their understanding  ,   Possibility in the future of MRI of the RIGHT  wrist to rule out ligament or tendon injury and to further evaluate for stress fracture versus other     Possibility in the future of regenerative injections such as Prolotherapy, Prolozone, or PRP injections into the right wrist.   Follow-up for a reevaluation as needed or if he is not improving and would like to start regenerative injections.